# Patient Record
Sex: FEMALE | Race: WHITE | NOT HISPANIC OR LATINO | Employment: FULL TIME | URBAN - METROPOLITAN AREA
[De-identification: names, ages, dates, MRNs, and addresses within clinical notes are randomized per-mention and may not be internally consistent; named-entity substitution may affect disease eponyms.]

---

## 2017-05-08 LAB — RAPID GROUP A STREP (OHS): NEGATIVE

## 2019-02-22 LAB — RAPID GROUP A STREP (OHS): NEGATIVE

## 2019-11-25 LAB
INFLUENZA A ANTIGEN, POC: NEGATIVE
INFLUENZA B ANTIGEN, POC: NEGATIVE
RAPID GROUP A STREP (OHS): NEGATIVE

## 2022-04-10 ENCOUNTER — HISTORICAL (OUTPATIENT)
Dept: ADMINISTRATIVE | Facility: HOSPITAL | Age: 47
End: 2022-04-10

## 2022-04-29 VITALS
HEIGHT: 63 IN | SYSTOLIC BLOOD PRESSURE: 114 MMHG | BODY MASS INDEX: 28.71 KG/M2 | OXYGEN SATURATION: 98 % | DIASTOLIC BLOOD PRESSURE: 78 MMHG | WEIGHT: 162.06 LBS

## 2022-09-21 ENCOUNTER — HISTORICAL (OUTPATIENT)
Dept: ADMINISTRATIVE | Facility: HOSPITAL | Age: 47
End: 2022-09-21

## 2023-05-04 ENCOUNTER — OFFICE VISIT (OUTPATIENT)
Dept: URGENT CARE | Facility: CLINIC | Age: 48
End: 2023-05-04
Payer: COMMERCIAL

## 2023-05-04 VITALS
HEIGHT: 63 IN | OXYGEN SATURATION: 97 % | HEART RATE: 103 BPM | DIASTOLIC BLOOD PRESSURE: 73 MMHG | TEMPERATURE: 100 F | SYSTOLIC BLOOD PRESSURE: 105 MMHG | WEIGHT: 177 LBS | BODY MASS INDEX: 31.36 KG/M2 | RESPIRATION RATE: 18 BRPM

## 2023-05-04 DIAGNOSIS — R07.89 CHEST TIGHTNESS: ICD-10-CM

## 2023-05-04 DIAGNOSIS — U07.1 COVID-19: Primary | ICD-10-CM

## 2023-05-04 DIAGNOSIS — U07.1 COVID-19: ICD-10-CM

## 2023-05-04 LAB
CTP QC/QA: YES
EKG 12-LEAD: NORMAL
PR INTERVAL: NORMAL
PRT AXES: NORMAL
QRS DURATION: NORMAL
QT/QTC: NORMAL
SARS-COV-2 RDRP RESP QL NAA+PROBE: POSITIVE
VENTRICULAR RATE: NORMAL

## 2023-05-04 PROCEDURE — 93000 ELECTROCARDIOGRAM COMPLETE: CPT | Mod: ,,,

## 2023-05-04 PROCEDURE — 87635: ICD-10-PCS | Mod: QW,,,

## 2023-05-04 PROCEDURE — 87635 SARS-COV-2 COVID-19 AMP PRB: CPT | Mod: QW,,,

## 2023-05-04 PROCEDURE — 99204 PR OFFICE/OUTPT VISIT, NEW, LEVL IV, 45-59 MIN: ICD-10-PCS | Mod: 25,,,

## 2023-05-04 PROCEDURE — 93000 POCT EKG 12-LEAD: ICD-10-PCS | Mod: ,,,

## 2023-05-04 PROCEDURE — 99204 OFFICE O/P NEW MOD 45 MIN: CPT | Mod: 25,,,

## 2023-05-04 RX ORDER — ALBUTEROL SULFATE 90 UG/1
2 AEROSOL, METERED RESPIRATORY (INHALATION) EVERY 6 HOURS PRN
Qty: 18 G | Refills: 0 | Status: SHIPPED | OUTPATIENT
Start: 2023-05-04 | End: 2023-05-04

## 2023-05-04 RX ORDER — PROGESTERONE 100 MG/1
100 CAPSULE ORAL
COMMUNITY
Start: 2022-11-11

## 2023-05-04 RX ORDER — SEMAGLUTIDE 1.34 MG/ML
INJECTION, SOLUTION SUBCUTANEOUS
COMMUNITY

## 2023-05-04 RX ORDER — MONTELUKAST SODIUM 10 MG/1
10 TABLET ORAL
COMMUNITY
Start: 2023-05-04

## 2023-05-04 RX ORDER — PROMETHAZINE HYDROCHLORIDE AND DEXTROMETHORPHAN HYDROBROMIDE 6.25; 15 MG/5ML; MG/5ML
5 SYRUP ORAL EVERY 6 HOURS PRN
Qty: 118 ML | Refills: 0 | Status: SHIPPED | OUTPATIENT
Start: 2023-05-04 | End: 2023-05-14

## 2023-05-04 RX ORDER — LEVOTHYROXINE SODIUM 75 UG/1
75 TABLET ORAL
COMMUNITY
Start: 2023-02-07

## 2023-05-04 RX ORDER — LEVALBUTEROL TARTRATE 45 UG/1
2 AEROSOL, METERED ORAL EVERY 4 HOURS PRN
Qty: 15 G | Refills: 0 | Status: SHIPPED | OUTPATIENT
Start: 2023-05-04

## 2023-05-04 RX ORDER — CYANOCOBALAMIN (VITAMIN B-12) 500 MCG
TABLET ORAL
COMMUNITY

## 2023-05-04 RX ORDER — ESCITALOPRAM OXALATE 5 MG/1
TABLET ORAL
COMMUNITY

## 2023-05-04 NOTE — PATIENT INSTRUCTIONS
Follow with your primary care provider in the next 24 hours.  EKG borderline, follow with Dr. Hsieh your cardiologist        X-ray negative per radiology  Fever / Body Aches: Use OTC Tylenol or Motrin per package instructions for fever or body aches.  Sore Throat: Use OTC lozenges or throat sprays, gargle with warm salt and water, warm tea with honey.   Cough:   Cover your mouth with coughing, avoid sharing cups or lip balm, wash your hand before eating or touching your face.    Please quarantine for 5 days from the onset of symptoms. On day 6 of illness, you can return to work/school as long as you have not experienced fever in the last 24 hours.   Seek emergency care for breathing difficulties, chest pain, shortness of breath, or confusion. Given the nature of this disease, severe respiratory distress can develop suddenly on day 8 or 9 of clinical course.  Follow up with you primary care doctor as needed.      Updated CDC guidelines can be found at: https://www.cdc.gov/coronavirus/2019-ncov/hcp/besnfr-ty-vmju.html

## 2023-05-04 NOTE — PROGRESS NOTES
"Subjective:      Patient ID: Gabriela Hand is a 47 y.o. female.    Vitals:  height is 5' 3" (1.6 m) and weight is 80.3 kg (177 lb). Her temperature is 100.1 °F (37.8 °C). Her blood pressure is 105/73 and her pulse is 103. Her respiration is 18 and oxygen saturation is 97%.     Chief Complaint: Sore Throat     Patient is a 47 y.o.  female who presents to urgent care with complaints of sore throat, headache, sinus pressure, fever (T-max 100.6) which began yesterday.  Patient reports COVID positive at home this morning.  Patient denies cough, neck stiffness, rash, GI symptoms  Patient reports central chest tightness intermittently over the last 4 days.  Patient reports history of panic attacks, takes Lexapro for anxiety and reports she is been increasing her anxiety medication over the last few days due to chest tightness.  Also states Mild relief with Tums. Patient denies any radiation of pain.  Pain is not reproducible at this time.  Over-the-counter factors include mucinex-d, flonase with no relief.    ROS   Objective:     Physical Exam   Constitutional: She is oriented to person, place, and time. She appears well-developed. She is cooperative.  Non-toxic appearance. She does not appear ill. No distress.   HENT:   Head: Normocephalic and atraumatic.   Ears:   Right Ear: Hearing, tympanic membrane, external ear and ear canal normal.   Left Ear: Hearing, tympanic membrane, external ear and ear canal normal.   Nose: No mucosal edema or nasal deformity. No epistaxis. Right sinus exhibits no maxillary sinus tenderness and no frontal sinus tenderness. Left sinus exhibits no maxillary sinus tenderness and no frontal sinus tenderness.   Mouth/Throat: Uvula is midline, oropharynx is clear and moist and mucous membranes are normal. No trismus in the jaw. Normal dentition. No uvula swelling. No posterior oropharyngeal edema.   Eyes: Conjunctivae and lids are normal. No scleral icterus.   Neck: Trachea normal and phonation " normal. Neck supple. No edema present. No erythema present. No neck rigidity present.   Cardiovascular: Normal rate, regular rhythm, normal heart sounds and normal pulses.   Pulmonary/Chest: Effort normal and breath sounds normal. No respiratory distress. She has no decreased breath sounds. She has no wheezes. She has no rhonchi.   Abdominal: Normal appearance.   Musculoskeletal: Normal range of motion.         General: No deformity. Normal range of motion.   Neurological: She is alert and oriented to person, place, and time. She exhibits normal muscle tone. Coordination normal.   Skin: Skin is warm, dry, intact, not diaphoretic and not pale.   Psychiatric: Her speech is normal and behavior is normal. Judgment and thought content normal.   Nursing note and vitals reviewed.  EKG:  Rapid sinus rhythm, heart rate 100, horizontal axis, long QT interval, consider hypocalcemia or quinidine like drug, borderline EKG  CXR: No acute cardiopulmonary process identified. Per radiology  Assessment:     1. COVID-19    2. Chest tightness        Plan:       COVID-19  -     POCT COVID-19 Rapid Screening  -     POCT EKG 12-LEAD (NOT FOR [facility] USE)  -     X-Ray Chest PA And Lateral  -     albuterol (VENTOLIN HFA) 90 mcg/actuation inhaler; Inhale 2 puffs into the lungs every 6 (six) hours as needed for Wheezing. Rescue  Dispense: 18 g; Refill: 0  -     promethazine-dextromethorphan (PROMETHAZINE-DM) 6.25-15 mg/5 mL Syrp; Take 5 mLs by mouth every 6 (six) hours as needed (cough).  Dispense: 118 mL; Refill: 0    Chest tightness       Strict ER precautions given for chest tightness however patient reports this is improved with Tums and also feels like her usual anxiety, panic attack related chest tightness.  Patient declines presenting to the emergency room at this time.  Strict ER precautions given for persistent chest pain, radiation of pain, shortness of breath     Has seen Dr. Hsieh in the past, will schedule follow-up due to  borderline EKG and symptoms.         X-ray negative per radiology  Fever / Body Aches: Use OTC Tylenol or Motrin per package instructions for fever or body aches.  Sore Throat: Use OTC lozenges or throat sprays, gargle with warm salt and water, warm tea with honey.   Cough:   Cover your mouth with coughing, avoid sharing cups or lip balm, wash your hand before eating or touching your face.    Please quarantine for 5 days from the onset of symptoms. On day 6 of illness, you can return to work/school as long as you have not experienced fever in the last 24 hours.   Seek emergency care for breathing difficulties, chest pain, shortness of breath, or confusion. Given the nature of this disease, severe respiratory distress can develop suddenly on day 8 or 9 of clinical course.  Follow up with you primary care doctor as needed.      Updated CDC guidelines can be found at: https://www.cdc.gov/coronavirus/2019-ncov/hcp/pjjglw-rx-njqb.html

## 2023-05-15 ENCOUNTER — TELEPHONE (OUTPATIENT)
Dept: URGENT CARE | Facility: CLINIC | Age: 48
End: 2023-05-15

## 2023-05-15 RX ORDER — DOXYCYCLINE 100 MG/1
100 CAPSULE ORAL 2 TIMES DAILY
Qty: 14 CAPSULE | Refills: 0 | Status: SHIPPED | OUTPATIENT
Start: 2023-05-15 | End: 2023-05-22

## 2023-05-27 DIAGNOSIS — Z82.49 FAMILY HISTORY OF HYPERTENSION: ICD-10-CM

## 2023-05-27 DIAGNOSIS — I10 HYPERTENSION: Primary | ICD-10-CM

## 2023-10-22 ENCOUNTER — OFFICE VISIT (OUTPATIENT)
Dept: URGENT CARE | Facility: CLINIC | Age: 48
End: 2023-10-22
Payer: COMMERCIAL

## 2023-10-22 VITALS
BODY MASS INDEX: 31.36 KG/M2 | HEART RATE: 79 BPM | TEMPERATURE: 100 F | HEIGHT: 63 IN | SYSTOLIC BLOOD PRESSURE: 130 MMHG | RESPIRATION RATE: 17 BRPM | WEIGHT: 177 LBS | OXYGEN SATURATION: 99 % | DIASTOLIC BLOOD PRESSURE: 79 MMHG

## 2023-10-22 DIAGNOSIS — J32.9 SINUSITIS, UNSPECIFIED CHRONICITY, UNSPECIFIED LOCATION: Primary | ICD-10-CM

## 2023-10-22 DIAGNOSIS — R50.9 FEVER, UNSPECIFIED FEVER CAUSE: ICD-10-CM

## 2023-10-22 LAB
CTP QC/QA: YES
CTP QC/QA: YES
POC MOLECULAR INFLUENZA A AGN: NEGATIVE
POC MOLECULAR INFLUENZA B AGN: NEGATIVE
SARS-COV-2 RDRP RESP QL NAA+PROBE: NEGATIVE

## 2023-10-22 PROCEDURE — 96372 THER/PROPH/DIAG INJ SC/IM: CPT | Mod: ,,, | Performed by: FAMILY MEDICINE

## 2023-10-22 PROCEDURE — 99213 PR OFFICE/OUTPT VISIT, EST, LEVL III, 20-29 MIN: ICD-10-PCS | Mod: 25,,, | Performed by: FAMILY MEDICINE

## 2023-10-22 PROCEDURE — 96372 PR INJECTION,THERAP/PROPH/DIAG2ST, IM OR SUBCUT: ICD-10-PCS | Mod: ,,, | Performed by: FAMILY MEDICINE

## 2023-10-22 PROCEDURE — 99213 OFFICE O/P EST LOW 20 MIN: CPT | Mod: 25,,, | Performed by: FAMILY MEDICINE

## 2023-10-22 PROCEDURE — 87635 SARS-COV-2 COVID-19 AMP PRB: CPT | Mod: QW,,, | Performed by: FAMILY MEDICINE

## 2023-10-22 PROCEDURE — 87502 INFLUENZA DNA AMP PROBE: CPT | Mod: QW,,, | Performed by: FAMILY MEDICINE

## 2023-10-22 PROCEDURE — 87635: ICD-10-PCS | Mod: QW,,, | Performed by: FAMILY MEDICINE

## 2023-10-22 PROCEDURE — 87502 POCT INFLUENZA A/B MOLECULAR: ICD-10-PCS | Mod: QW,,, | Performed by: FAMILY MEDICINE

## 2023-10-22 RX ORDER — PROMETHAZINE HYDROCHLORIDE AND DEXTROMETHORPHAN HYDROBROMIDE 6.25; 15 MG/5ML; MG/5ML
5 SYRUP ORAL EVERY 6 HOURS PRN
Qty: 120 ML | Refills: 0 | Status: SHIPPED | OUTPATIENT
Start: 2023-10-22 | End: 2023-10-28

## 2023-10-22 RX ORDER — DOXYCYCLINE 100 MG/1
100 CAPSULE ORAL 2 TIMES DAILY
Qty: 14 CAPSULE | Refills: 0 | Status: SHIPPED | OUTPATIENT
Start: 2023-10-22 | End: 2023-10-29

## 2023-10-22 RX ORDER — DEXAMETHASONE SODIUM PHOSPHATE 100 MG/10ML
10 INJECTION INTRAMUSCULAR; INTRAVENOUS
Status: COMPLETED | OUTPATIENT
Start: 2023-10-22 | End: 2023-10-22

## 2023-10-22 RX ADMIN — DEXAMETHASONE SODIUM PHOSPHATE 10 MG: 100 INJECTION INTRAMUSCULAR; INTRAVENOUS at 10:10

## 2023-10-22 NOTE — PATIENT INSTRUCTIONS
Negative COVID negative flu  Medications sent to pharmacy  Cough syrup may cause drowsiness.  Do not drink alcohol or drive when taking it  Start taking an allergy pill daily such as claritin, zyrtec, allegrea or xyzal. Also start using a nasal steroid spray such as flonase or nasacort daily. If you are not being treated for high blood pressure, you can also take decongestant such as sudafed as needed. They can be purchased over the counter.Monitor for fever. Take tylenol/acetaminophen or ibuprofen as needed. Rest and hydrate. If symptoms persist or worsen, return to clinic or seek medical attention immediately.

## 2023-10-22 NOTE — PROGRESS NOTES
"Subjective:      Patient ID: Gabriela Hand is a 48 y.o. female.    Vitals:  height is 5' 3" (1.6 m) and weight is 80.3 kg (177 lb). Her temperature is 99.5 °F (37.5 °C). Her blood pressure is 130/79 and her pulse is 79. Her respiration is 17 and oxygen saturation is 99%.     Chief Complaint: Cough ( Patient is a 48 y.o. female who presents to urgent care with complaints of cough, chest congestion, temp 99.5, body aches, headache x 3 days. Alleviating factors include tylenol with no relief. Patient denies n/v/d or sore throat. )     Patient is a 48 y.o. female who presents to urgent care with complaints of cough, chest congestion, temp 99.5, body aches, headache x 3 days. Alleviating factors include tylenol with no relief. Patient denies n/v/d or sore throat.  Denies any shortness a breath or wheezing.    Cough      Constitution: Negative.   HENT: Negative.     Cardiovascular: Negative.    Eyes: Negative.    Respiratory:  Positive for cough.    Gastrointestinal: Negative.    Genitourinary: Negative.    Musculoskeletal: Negative.    Skin: Negative.    Allergic/Immunologic: Negative.    Neurological: Negative.    Hematologic/Lymphatic: Negative.       Objective:     Physical Exam   Constitutional: She is oriented to person, place, and time. She appears well-developed. She is cooperative.  Non-toxic appearance. She does not appear ill. No distress.   HENT:   Head: Normocephalic and atraumatic.   Ears:   Right Ear: Hearing and external ear normal.   Left Ear: Hearing and external ear normal.   Mouth/Throat: Oropharynx is clear and moist and mucous membranes are normal. No posterior oropharyngeal erythema (postnasal drip).   Eyes: Conjunctivae and lids are normal.   Neck: Trachea normal and phonation normal. Neck supple. No edema present. No erythema present. No neck rigidity present.   Cardiovascular: Normal rate.   Pulmonary/Chest: Effort normal and breath sounds normal. No stridor. No respiratory distress. She has no " decreased breath sounds. She has no wheezes. She has no rhonchi. She has no rales.   Abdominal: Normal appearance.   Neurological: She is alert and oriented to person, place, and time. She exhibits normal muscle tone. Coordination normal.   Skin: Skin is warm, dry, intact, not diaphoretic and no rash.   Psychiatric: Her speech is normal and behavior is normal. Mood, judgment and thought content normal.   Nursing note and vitals reviewed.         Previous History      Review of patient's allergies indicates:   Allergen Reactions    Penicillins Rash       Past Medical History:   Diagnosis Date    Anxiety     Hypothyroidism      Current Outpatient Medications   Medication Instructions    cholecalciferol, vitamin D3, (VITAMIN D3) 10 mcg (400 unit) Tab Vitamin D3 Take No date recorded No form recorded No frequency recorded No route recorded No set duration recorded No set duration amount recorded active No dosage strength recorded No dosage strength units of measure recorded    doxycycline (MONODOX) 100 mg, Oral, 2 times daily    EScitalopram oxalate (LEXAPRO) 5 MG Tab escitalopram 5 mg tablet   TAKE 1 TABLET BY MOUTH EVERY DAY    levalbuterol (XOPENEX HFA) 45 mcg/actuation inhaler 2 puffs, Inhalation, Every 4 hours PRN    levothyroxine (SYNTHROID) 75 mcg, Oral    montelukast (SINGULAIR) 10 mg, Oral    progesterone (PROMETRIUM) 100 mg, Oral    promethazine-dextromethorphan (PROMETHAZINE-DM) 6.25-15 mg/5 mL Syrp 5 mLs, Oral, Every 6 hours PRN    semaglutide (OZEMPIC) 0.25 mg or 0.5 mg(2 mg/1.5 mL) pen injector Ozempic 0.25 mg or 0.5 mg (2 mg/1.5 mL) subcutaneous pen injector     Past Surgical History:   Procedure Laterality Date    KNEE SURGERY Left      Family History   Family history unknown: Yes       Social History     Tobacco Use    Smoking status: Never     Passive exposure: Never    Smokeless tobacco: Never   Substance Use Topics    Alcohol use: Not Currently    Drug use: Never        Physical Exam      Vital Signs  "Reviewed   /79   Pulse 79   Temp 99.5 °F (37.5 °C)   Resp 17   Ht 5' 3" (1.6 m)   Wt 80.3 kg (177 lb)   LMP 10/22/2023   SpO2 99%   BMI 31.35 kg/m²        Procedures    Procedures     Labs     Results for orders placed or performed in visit on 10/22/23   POCT COVID-19 Rapid Screening   Result Value Ref Range    POC Rapid COVID Negative Negative     Acceptable Yes    POCT Influenza A/B Molecular   Result Value Ref Range    POC Molecular Influenza A Ag Negative Negative, Not Reported    POC Molecular Influenza B Ag Negative Negative, Not Reported     Acceptable Yes        Assessment:     1. Sinusitis, unspecified chronicity, unspecified location    2. Fever, unspecified fever cause        Plan:   Negative COVID negative flu  Medications sent to pharmacy  Cough syrup may cause drowsiness.  Do not drink alcohol or drive when taking it  Start taking an allergy pill daily such as claritin, zyrtec, allegrea or xyzal. Also start using a nasal steroid spray such as flonase or nasacort daily. If you are not being treated for high blood pressure, you can also take decongestant such as sudafed as needed. They can be purchased over the counter.Monitor for fever. Take tylenol/acetaminophen or ibuprofen as needed. Rest and hydrate. If symptoms persist or worsen, return to clinic or seek medical attention immediately.       Sinusitis, unspecified chronicity, unspecified location    Fever, unspecified fever cause  -     POCT COVID-19 Rapid Screening  -     POCT Influenza A/B Molecular    Other orders  -     dexAMETHasone injection 10 mg  -     promethazine-dextromethorphan (PROMETHAZINE-DM) 6.25-15 mg/5 mL Syrp; Take 5 mLs by mouth every 6 (six) hours as needed (cough).  Dispense: 120 mL; Refill: 0  -     doxycycline (MONODOX) 100 MG capsule; Take 1 capsule (100 mg total) by mouth 2 (two) times daily. for 7 days  Dispense: 14 capsule; Refill: 0                    "